# Patient Record
(demographics unavailable — no encounter records)

---

## 2025-04-29 NOTE — REASON FOR VISIT
[Other: ____] : [unfilled] [FreeTextEntry1] : Diagnostic Tests: ------------------------ [id number]. Normal EKG.

## 2025-04-29 NOTE — HISTORY OF PRESENT ILLNESS
[FreeTextEntry1] : Ms. Salmeron is a 68yo F with PMHx of HLD, hypothyroid, hyperparathyroidism, Breast CA in remission who presents to [facility] care. Her PMD is Dr. Jarett Solis. Patient has been losing weight and her SOB has improved. She gets leg cramping with pravastatin. She previously had tolerated pitavastatin but insurance did not cover.

## 2025-04-29 NOTE — HISTORY OF PRESENT ILLNESS
[FreeTextEntry1] : Ms. Salmeron is a 68yo F with PMHx of HLD, hypothyroid, hyperparathyroidism, Breast CA in remission who presents to [facility] care. Her PMD is Dr. Jarett Solis. Patient has been losing weight and her SOB has improved. She gets leg cramping with pravastatin. She previously had tolerated pitavastatin but insurance did not cover.

## 2025-04-29 NOTE — ASSESSMENT
[FreeTextEntry1] : HLD: , , HDL 54,  (11/24)->, , HDL 53,  (03/25) -Continue with pravastatin 40mg and ezetimibe 10mg PO daily.  -Check CT CAC.  -If elevated, may try [id number].  -Discussed therapeutic lifestyle changes to promote improved lipid metabolism.   Follow up in 3 months -Check TTE.

## 2025-05-27 NOTE — ASSESSMENT
[FreeTextEntry1] : 67 year old lady with known post HERNANDEZ hypothyroidism, thyroid nodules, primary hyperparathyroidism, osteoporosis who present for follow up  previous endo : Dr Ayers retired    # post HERNANDEZ hypothyroidism ( for right lower autonomous hot nodule)    - on Synthroid 88 mcg daily , stable on this dose good pill technique and cliniclly euthyroid except for inability to lose weight  - 2/2024: Tfts normal continue same dose now  - 7/2024: Tfts normal on Synthroid 88 mcg daily - 11/2024: TSH 1.45 Ft4 1.4 on Synthroid 88 mcg daily  5/2025: Tfts normal on Synthroid 88 mcg daily  # primary hyperparathyroidism causing hypercalcemia / osteoporosis s/p parathyroidectomy  - diagnosed with previous endo  - no kidney stones, previous 24 hour urine calcium ok ( by previous endo ) - was on prolia for around 5 years then off for 2-3 years then restarted again and most recently ( 6/2023 ) was changed to fosamax , took it for few months then stopped because of SE  - DXA 10/2022 : improvement in BMD  - to not that patient had breast cancer s/p bilateral mastectomy and implant  - followed by GYN , plan to redo DXA scan 10/2024  - for osteoporosis , likely multifactorial  - sestamibi scan showed possible left lower adenoma, refered to ENT - 6/12 s/p parathyroidectomy, pathology confirming adenoma, off calcium now , on Vit D 2000 IU 3 times/ week calcium normal now - 11/2024: calcium normal PTH elevated ? vit D not done - DXA scan ( 10/2024)  showed worsening osteoporosis , she started prolia with PCP 12/2024 and monitored by them  - 5/2025: calcium ok , PTH upper end , 25 vit D ok , on prolia  - repeat DXA scan 10/2026    # MNG : multiple thyroid nodules, followed over 8 years now  - last FNA was 1/2017 : [place] II ( reported atypia )?? one nodule FLUS  - no FH of thyroid cancer no compressive symptoms - nodules stable over past few years , recent sono one nodule  bigger but was at that size prior  - 2/2024: has one Tr5 nodule but stable from prior  - 4/2024: s/p FNA right thyroid nodule : bethesda II   - 5/2025: thyroid US stable , monitor yearly    # obesity   BMI 30 - tried Ozempic samples with good response, then Wegovy was denied by insurance - + weight gain BMI 31 and is unable to lose , started on Ozempic t 0.5 mg Q week tolerating it well - 2/2024: dose increase to 1 mg Q week , good response and tolerating it well - 4/2024: switched to mounjaro - 7/2024: just started Mounjaro 7.5 mg Q week , tolerating it well, continue  - 11/2024: On Mounjaro 7.5 mg Q week lost weight , but feeling hunger again, will up to 10 mg Q week  - 5/2025: will up mounjaro to 12.5 mg Q week as mild weight gain    # DL : -on pravastatin 40 mg every other day because of cramps - LDLdown now taking pravastatin 40 mg every other day and ezetimibe 10 mg daily - 5/2025: had calcium score was mild , she is off pravastatin bu t remain on ezetimibe  - will try livalo ( had good response prior ) and she will follow up with Cardiology

## 2025-05-27 NOTE — HISTORY OF PRESENT ILLNESS
[FreeTextEntry1] : 67 year old lady with known post HERNANDEZ hypothyroidism, thyroid nodules, primary hyperparathyroidism, osteoporosis who present for follow up  previous endo : Dr Ayers retired    # post HERNANDEZ hypothyroidism ( for right lower autonomous hot nodule)    - on Synthroid 88 mcg daily , stable on this dose good pill technique and clinically euthyroid except for inability to lose weight  - 2/2024: tfts normal on Synthroid 88 mcg daily  - 7/2024: Tfts normal on Synthroid 88 mcg daily  - 11/2024: TSH 1.45  Ft4 1.4  on Synthroid 88 mcg daily  - 5/2025: Tfts normal on Synthroid 88 mcg daily    #primary hyperparathyroidism causing hypercalcemia / osteoporosis  - diagnosed with previous endo  - no kidney stones - was on prolia for around 5 years then off for 2-3 years then restarted again and most recently ( 6/2023 ) was changed to fosamax  - recent DXA 10/2022 : improvement in BMD  - to note that patient had breast cancer s/p bilateral mastectomy and implant  - followed by GYN  - she stopped Fosamax since 9/2023 t as she had GI SE , and now plan to redo DXA 10/2024 and reassess  - sestamibi scan showed possible left lower adenoma, referred to ENT  - 6/12/24 s/p parathyroidectomy, pathology confirming adenoma, off calcium now , on Vit D 2000 IU 3 times/ week  calcium normal now  - 11/2024: calcium normal PTH elevated ? vit D not done  5/2025: calcium and PTH ok  - DXA scan showed worsening osteoporosis , patient will get dental clearance , reviewed dental clearance and she decided to take Reclast     # MNG : multiple thyroid nodules, followed over 8 years now  - last FNA was 1/2017 : bethesda II ( reported atypia )?? one nodule FLUS  - no FH of thyroid cancer no compressive symptoms - nodules stable over past few years , sono one nodule  bigger but was at that size prior  - 2/2024: has one Tr5 nodule but stable from prior given  - 4/2024: repeat FNA of right mid thyroid nodule was [place] II ( as was going for parathyroid surgery )  - thyroid US : stable nodules      # obesity  - tried Ozempic samples with good response, then Wegovy was denied by insurance  - + weight gain BMI  31 and is unable to lose , started on Ozempic t 0.5 mg Q week tolerating it well  - 2/2024: dose increase to 1 mg Q week , good response and tolerating it well  - 4/2024: switched to mounjaro  - 7/2024: just started Mounjaro 7.5 mg Q week , tolerating it well  - 11/2024: On Mounjaro 7.5 mg Q week lost weight , but feeling hunger again    # DL :  -on pravastatin 40 mg every other day because of cramps  - LDLdown now taking pravastatin 40 mg every other day and ezetimibe 10 mg daily

## 2025-05-27 NOTE — REASON FOR VISIT
[Follow - Up] : a follow-up visit [Hypercalcemia] : hypercalcemia [Osteoporosis] : osteoporosis [Thyroid nodule/ MNG] : thyroid nodule/ MNG [Hyperparathyroidism] : hyperparathyroidism [Weight Management/Obesity] : weight management/obesity

## 2025-05-27 NOTE — PHYSICAL EXAM
[Alert] : alert [Healthy Appearance] : healthy appearance [No Acute Distress] : no acute distress [No Proptosis] : no proptosis [No Lid Lag] : no lid lag [Well Healed Scar] : well healed scar [No Respiratory Distress] : no respiratory distress [No Accessory Muscle Use] : no accessory muscle use [Clear to Auscultation] : lungs were clear to auscultation bilaterally [Normal S1, S2] : normal S1 and S2 [No Murmurs] : no murmurs [Regular Rhythm] : with a regular rhythm [No Edema] : no peripheral edema [No CVA Tenderness] : no ~M costovertebral angle tenderness [No Stigmata of Cushings Syndrome] : no stigmata of Cushings Syndrome [No Tremors] : no tremors [Oriented x3] : oriented to person, place, and time [de-identified] : nodular

## 2025-05-27 NOTE — DATA REVIEWED
[FreeTextEntry1] : reviewed outside medical record from previous endo  6/2023:  LDL 97  tg 135 crea 0.9  GFR 71 calcium 11.1  albumin 4.1 alk phos 59 PTH 89  Ph 3.3  TSH 1.73   1/2024:  LDL 81  TG 75  glucose 91 GFR 66 crea 0.95  calcium 10.7 albumin 4.2  A1c 5.1% ionized calcium 5.6    TSH 1.19  Ft4 1.3 25 vit d 38  6/20/24: PTH 43  calcium 9.8  7/2024: TSH 1.56  Ft4 1.4    Tg 125 calcium 9.4 albumin 4.crea 0.84  GFR 77 A1c 5.4% 25 vit d 62  11/2024: A1c 5.3%     calcium 9.1   glucose 83  crea 0.84  GFR 76  TSH 1.45  Ft4 1.4   5/2025: A1c 5.2%   Tg 146 calcium 9.4  albumin 4.2  GFR 72 PTH 59 TSH 2.12  Ft4 1.6    6/12:24 : left inferior parathyroid 2820 mg consistent with parathyroid adenoma    thyroid US ( 3/2023) : right lower nodule : 11.1x8.7x9 mm hypoechoic TR4 unchanged  right lower nodules 7.3x2.8x6.1 mm TR4 unchanged  left lower nodule : 11.8x5.6x9.6  mm Tr4 slightly larger  no cervical LN   thyroid US ( 1/2024)  - right nodule 1: 1x0.9x0.9  (smaller then 2019 ) solid , hypoechoic echogenic foci TR5 ...s/p FNA was BEtehsda II  - right nodule 2: 0.6x0.3x0.6  solid hypo Tr4  - left nodule 1 :0.7x0.9x0.5  solid hypoechoic TR4    thyroid US ( 5/2025) :  Right nodule 1 : 1 cm TR5  Right nodule 2: 0.5 cm TR4  left nodule : non visualized   SESTAMIBI SCAN : focal increased activity to the left lower pole of left thyroid lobe may represent parathyroid adenoma    DXA scan ( 10/2022) : AP spine T score -1.7 ( improved from 2020 )  left fem neck T score -1.4   DXA scan 10/2024:worsened at spine and hip level